# Patient Record
Sex: MALE | Race: WHITE | NOT HISPANIC OR LATINO | Employment: OTHER | URBAN - METROPOLITAN AREA
[De-identification: names, ages, dates, MRNs, and addresses within clinical notes are randomized per-mention and may not be internally consistent; named-entity substitution may affect disease eponyms.]

---

## 2017-03-08 ENCOUNTER — TELEPHONE (OUTPATIENT)
Dept: NEUROSURGERY | Facility: CLINIC | Age: 82
End: 2017-03-08

## 2017-03-08 DIAGNOSIS — D32.9 MENINGIOMA: Primary | ICD-10-CM

## 2017-03-08 NOTE — TELEPHONE ENCOUNTER
CARLOS RODARTE, DISCUSSED NEW SYMPTOMS, WILL GET MRI SOONER, PROB MID MARCH AND APPT TO FU WITH CAROLINE AFTER. APPT SLIP IN THE MAIL.

## 2017-03-08 NOTE — TELEPHONE ENCOUNTER
----- Message from Dalila Oliva sent at 3/8/2017 10:51 AM CST -----  Contact: dorian (sister) @ [phone] or [phone]  Pt sister would like to speak with with someone to see if pts mri can be scheduled earlier then dr mason requested.  pls call.

## 2017-04-10 ENCOUNTER — OFFICE VISIT (OUTPATIENT)
Dept: NEUROSURGERY | Facility: CLINIC | Age: 82
End: 2017-04-10
Payer: MEDICARE

## 2017-04-10 ENCOUNTER — HOSPITAL ENCOUNTER (OUTPATIENT)
Dept: RADIOLOGY | Facility: HOSPITAL | Age: 82
Discharge: HOME | End: 2017-04-10
Attending: NEUROLOGICAL SURGERY
Payer: MEDICARE

## 2017-04-10 VITALS
SYSTOLIC BLOOD PRESSURE: 142 MMHG | DIASTOLIC BLOOD PRESSURE: 79 MMHG | TEMPERATURE: 98 F | HEIGHT: 72 IN | WEIGHT: 173 LBS | BODY MASS INDEX: 23.43 KG/M2 | HEART RATE: 60 BPM

## 2017-04-10 DIAGNOSIS — D32.9 MENINGIOMA: ICD-10-CM

## 2017-04-10 DIAGNOSIS — D32.9 MENINGIOMA: Primary | ICD-10-CM

## 2017-04-10 PROCEDURE — 99213 OFFICE O/P EST LOW 20 MIN: CPT | Mod: PBBFAC | Performed by: NEUROLOGICAL SURGERY

## 2017-04-10 PROCEDURE — 99999 PR PBB SHADOW E&M-EST. PATIENT-LVL III: CPT | Mod: PBBFAC,,, | Performed by: NEUROLOGICAL SURGERY

## 2017-04-10 PROCEDURE — 99213 OFFICE O/P EST LOW 20 MIN: CPT | Mod: S$PBB,,, | Performed by: NEUROLOGICAL SURGERY

## 2017-04-10 PROCEDURE — 70553 MRI BRAIN STEM W/O & W/DYE: CPT | Mod: 26,GC,, | Performed by: RADIOLOGY

## 2017-04-10 RX ORDER — GADOBUTROL 604.72 MG/ML
8 INJECTION INTRAVENOUS
Status: COMPLETED | OUTPATIENT
Start: 2017-04-10 | End: 2017-04-10

## 2017-04-10 RX ORDER — RIVAROXABAN 15 MG/1
TABLET, FILM COATED ORAL
COMMUNITY
Start: 2017-01-12 | End: 2018-12-27

## 2017-04-10 RX ADMIN — GADOBUTROL 8 ML: 604.72 INJECTION INTRAVENOUS at 10:04

## 2017-04-10 NOTE — MR AVS SNAPSHOT
[facility] - Neurosurgery 7th Fl  [address]  [facility] [address]  Phone: [phone]                  Robe Saavedra   4/10/2017 11:30 AM   Office Visit    Description:  Male : 1928   Provider:  Gerald Graves MD   Department:  [facility] - Neurosurgery 7th Fl           Reason for Visit     Sinus tumor           Diagnoses this Visit        Comments    Meningioma    -  Primary            To Do List           Goals (5 Years of Data)     None      Follow-Up and Disposition     Return in about 1 year (around 4/10/2018) for reevaluation.      [facility]     [facility] On Call Nurse Care Line -  Assistance  Unless otherwise directed by your provider, please contact [facility], our nurse care line that is available for  assistance.     Registered nurses in the [facility] provide: appointment scheduling, clinical advisement, health education, and other advisory services.  Call: [phone] (toll free)               Medications           Message regarding Medications     Verify the changes and/or additions to your medication regime listed below are the same as discussed with your clinician today.  If any of these changes or additions are incorrect, please notify your healthcare provider.        STOP taking these medications     hydrochlorothiazide (HYDRODIURIL) 50 MG tablet Take 50 mg by mouth once daily.    vitamin D 185 MG Tab Take 185 mg by mouth once daily.    dronedarone (MULTAQ) 400 mg Tab Take 400 mg by mouth 2 (two) times daily with meals.           Verify that the below list of medications is an accurate representation of the medications you are currently taking.  If none reported, the list may be blank. If incorrect, please contact your healthcare provider. Carry this list with you in case of emergency.           Current Medications     amlodipine (NORVASC) 5 MG tablet Take 2.5 mg by mouth once daily.     brinzolamide (AZOPT) 1 % ophthalmic suspension 1 drop  2 (two) times daily.    lansoprazole (PREVACID) 30 MG capsule Take 30 mg by mouth once daily.    POLYETHYLENE GLYCOL 3350 (MIRALAX ORAL) Take by mouth.    testosterone (ANDROGEL) 1 %(50 mg/5 gram) GlPk Apply topically once daily.    travoprost, benzalkonium, (TRAVATAN) 0.004 % ophthalmic solution Place 1 drop into both eyes every evening.    XARELTO 15 mg Tab            Clinical Reference Information           Your Vitals Were     BP Pulse Temp Height Weight BMI    142/79 60 98.2 °F (36.8 °C) (Oral) 6' (1.829 m) 78.5 kg (173 lb) 23.46 kg/m2      Blood Pressure          Most Recent Value    BP  (!)  142/79      Allergies as of 4/10/2017     No Known Allergies      Immunizations Administered on Date of Encounter - 4/10/2017     None      [facility] Sign-Up     Activating your [facility] account is as easy as 1-2-3!     1) Visit [email or web address], select Sign Up Now, enter this activation code and your date of birth, then select Next.  [email or web address]  Expires: 5/25/2017 12:45 PM      2) Create a username and password to use when you visit [facility] in the future and select a security question in case you lose your password and select Next.    3) Enter your e-mail address and click Sign Up!    Additional Information  If you have questions, please e-mail [email or web address] or call [phone] to talk to our [facility] staff. Remember, [facility] is NOT to be used for urgent needs. For medical emergencies, dial 911.         Language Assistance Services     ATTENTION: Language assistance services are available, free of charge. Please call [phone].      ATENCIÓN: Si habla jose, tiene a charles disposición servicios gratuitos de asistencia lingüística. Llame al [phone].     CHÚ Ý: N?u b?n nói Ti?ng Vi?t, có các d?ch v? h? tr? ngôn ng? mi?n phí dành cho b?n. G?i s? [phone].         [facility] complies with applicable Federal civil rights laws and does not discriminate on the basis of  race, color, national origin, age, disability, or sex.

## 2017-04-10 NOTE — LETTER
April 10, 2017      Marycruz Smith MD  [address] [facility] [address]           [facility] - Neurosurgery 7th Fl  [address]  [place] [address]  Phone: [phone]          Patient: Robe Saavedra   MR Number: [id number]   YOB: 1928   Date of Visit: 4/10/2017       Dear Dr. Marycruz Smith:    Thank you for referring Robe Saavedra to me for evaluation. Attached you will find relevant portions of my assessment and plan of care.    If you have questions, please do not hesitate to call me. I look forward to following Robe Saavedra along with you.    Sincerely,    Gerald Graves MD    Enclosure  CC:  No Recipients    If you would like to receive this communication electronically, please contact [email or web address] or [phone] to request more information on [facility] Link access.    For providers and/or their staff who would like to refer a patient to [facility], please contact us through our one-stop-shop provider referral line, [facility], at [phone].    If you feel you have received this communication in error or would no longer like to receive these types of communications, please e-mail [email or web address]

## 2017-04-10 NOTE — PROGRESS NOTES
This office note has been dictated.    Robe Saavedra returned in neurosurgical followup to the office today.  He has   taken a couple of falls over the past three months and one of these he was on   the floor at home and unable to get up for several hours.  He was seen at [facility] in February with chest pain and cardiac arrhythmia.    This was treated.  Pacemaker was considered, but the patient did not wish to   proceed with this.  He does remain on Xarelto for stroke prevention because of   the atrial fibrillation.  He feels he has lost a little ground with vision and   was told by his ophthalmologist that he has early macular degeneration.  His   sister also notes that he has been having some increased difficulty with   short-term memory.  It was felt this might be related to his falls.    On brief examination today, he is alert and cooperative and making good sense.    He is speaking well.  I did not do a formal mental status examination, but he   seems reasonably well oriented.  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  There is a little   superior and inferior rectus. He can see out of the right eye, but things seem   distorted to him.  Extraocular movements are full on the left and vision seems   generally preserved.  There is reduced range of motion of his shoulders and he   has been getting injections for this.  He is moving his extremities well.  He is   using his prosthesis on the right lower extremity.    MRI of the brain was repeated at [facility] today and compared to his   earlier scans. 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  This is   about the same size and shape as his previous scans.  The carotid artery flow   void can be followed through the tumor and the carotid seems to be patent.    There is moderate white matter microvascular disease.    I do not believe that the cavernous sinus meningioma requires  any specific   neurosurgical intervention.  It has been stable for some time.  There is no   evidence for subdural hematoma or other factor complicating his falls.  I will   plan to see him back in about one year for reevaluation.  I do not believe we   will need to repeat MRI unless there are new symptoms.      RDS/IN  dd: 04/10/2017 12:45:00 (CDT)  td: 04/11/2017 06:49:23 (CDT)  Doc ID   #[id number]  Job ID #[id number]    CC: Robe Saavedra

## 2018-12-27 ENCOUNTER — HOSPITAL ENCOUNTER (OUTPATIENT)
Facility: HOSPITAL | Age: 83
Discharge: NURSING, REHAB OR LONG-TERM CARE | End: 2018-12-28
Attending: EMERGENCY MEDICINE | Admitting: HOSPITALIST
Payer: MEDICARE

## 2018-12-27 DIAGNOSIS — R91.1 PULMONARY NODULE: Primary | ICD-10-CM

## 2018-12-27 DIAGNOSIS — R50.9 FEVER, UNSPECIFIED FEVER CAUSE: ICD-10-CM

## 2018-12-27 DIAGNOSIS — N30.00 ACUTE CYSTITIS WITHOUT HEMATURIA: ICD-10-CM

## 2018-12-27 DIAGNOSIS — R78.81 BACTEREMIA: ICD-10-CM

## 2018-12-27 DIAGNOSIS — N30.01 ACUTE CYSTITIS WITH HEMATURIA: ICD-10-CM

## 2018-12-27 PROBLEM — I10 HTN (HYPERTENSION): Status: ACTIVE | Noted: 2018-12-27

## 2018-12-27 PROBLEM — N39.0 UTI (URINARY TRACT INFECTION): Status: ACTIVE | Noted: 2018-12-27

## 2018-12-27 PROBLEM — H40.9 GLAUCOMA OF BOTH EYES: Status: ACTIVE | Noted: 2018-12-27

## 2018-12-27 PROBLEM — I48.91 A-FIB: Status: ACTIVE | Noted: 2018-12-27

## 2018-12-27 LAB
ALBUMIN SERPL BCP-MCNC: 3.2 G/DL
ALP SERPL-CCNC: 83 U/L
ALT SERPL W/O P-5'-P-CCNC: 16 U/L
ANION GAP SERPL CALC-SCNC: 9 MMOL/L
AST SERPL-CCNC: 17 U/L
BACTERIA #/AREA URNS HPF: ABNORMAL /HPF
BASOPHILS # BLD AUTO: 0.01 K/UL
BASOPHILS NFR BLD: 0.1 %
BILIRUB SERPL-MCNC: 0.5 MG/DL
BILIRUB UR QL STRIP: NEGATIVE
BUN SERPL-MCNC: 20 MG/DL
CALCIUM SERPL-MCNC: 9.7 MG/DL
CHLORIDE SERPL-SCNC: 105 MMOL/L
CLARITY UR: ABNORMAL
CO2 SERPL-SCNC: 23 MMOL/L
COLOR UR: YELLOW
CREAT SERPL-MCNC: 0.8 MG/DL
CTP QC/QA: YES
DIFFERENTIAL METHOD: ABNORMAL
EOSINOPHIL # BLD AUTO: 0.1 K/UL
EOSINOPHIL NFR BLD: 0.6 %
ERYTHROCYTE [DISTWIDTH] IN BLOOD BY AUTOMATED COUNT: 14.8 %
EST. GFR  (AFRICAN AMERICAN): >60 ML/MIN/1.73 M^2
EST. GFR  (NON AFRICAN AMERICAN): >60 ML/MIN/1.73 M^2
FERRITIN SERPL-MCNC: 91 NG/ML
FLUAV AG NPH QL: NEGATIVE
FLUBV AG NPH QL: NEGATIVE
GLUCOSE SERPL-MCNC: 121 MG/DL
GLUCOSE UR QL STRIP: NEGATIVE
HCT VFR BLD AUTO: 39.1 %
HGB BLD-MCNC: 13.3 G/DL
HGB UR QL STRIP: ABNORMAL
HYALINE CASTS #/AREA URNS LPF: 0 /LPF
IRON SERPL-MCNC: 22 UG/DL
KETONES UR QL STRIP: NEGATIVE
LACTATE SERPL-SCNC: 1.3 MMOL/L
LEUKOCYTE ESTERASE UR QL STRIP: ABNORMAL
LYMPHOCYTES # BLD AUTO: 1.3 K/UL
LYMPHOCYTES NFR BLD: 10.9 %
MCH RBC QN AUTO: 30.6 PG
MCHC RBC AUTO-ENTMCNC: 34 G/DL
MCV RBC AUTO: 90 FL
MICROSCOPIC COMMENT: ABNORMAL
MONOCYTES # BLD AUTO: 0.8 K/UL
MONOCYTES NFR BLD: 6.8 %
NEUTROPHILS # BLD AUTO: 9.6 K/UL
NEUTROPHILS NFR BLD: 81.6 %
NITRITE UR QL STRIP: POSITIVE
PH UR STRIP: 5 [PH] (ref 5–8)
PLATELET # BLD AUTO: 209 K/UL
PMV BLD AUTO: 10.6 FL
POTASSIUM SERPL-SCNC: 3.9 MMOL/L
PROT SERPL-MCNC: 6.8 G/DL
PROT UR QL STRIP: ABNORMAL
RBC # BLD AUTO: 4.35 M/UL
RBC #/AREA URNS HPF: 10 /HPF (ref 0–4)
SATURATED IRON: 7 %
SODIUM SERPL-SCNC: 137 MMOL/L
SP GR UR STRIP: 1 (ref 1–1.03)
SQUAMOUS #/AREA URNS HPF: 5 /HPF
TOTAL IRON BINDING CAPACITY: 315 UG/DL
TRANSFERRIN SERPL-MCNC: 213 MG/DL
URN SPEC COLLECT METH UR: ABNORMAL
UROBILINOGEN UR STRIP-ACNC: NEGATIVE EU/DL
WBC # BLD AUTO: 11.7 K/UL
WBC #/AREA URNS HPF: 35 /HPF (ref 0–5)
WBC CLUMPS URNS QL MICRO: ABNORMAL

## 2018-12-27 PROCEDURE — 83540 ASSAY OF IRON: CPT

## 2018-12-27 PROCEDURE — 87088 URINE BACTERIA CULTURE: CPT

## 2018-12-27 PROCEDURE — 87077 CULTURE AEROBIC IDENTIFY: CPT

## 2018-12-27 PROCEDURE — 96365 THER/PROPH/DIAG IV INF INIT: CPT

## 2018-12-27 PROCEDURE — 99284 EMERGENCY DEPT VISIT MOD MDM: CPT | Mod: 25

## 2018-12-27 PROCEDURE — G0378 HOSPITAL OBSERVATION PER HR: HCPCS

## 2018-12-27 PROCEDURE — 63600175 PHARM REV CODE 636 W HCPCS: Performed by: EMERGENCY MEDICINE

## 2018-12-27 PROCEDURE — 82728 ASSAY OF FERRITIN: CPT

## 2018-12-27 PROCEDURE — 96366 THER/PROPH/DIAG IV INF ADDON: CPT

## 2018-12-27 PROCEDURE — 80053 COMPREHEN METABOLIC PANEL: CPT

## 2018-12-27 PROCEDURE — 85025 COMPLETE CBC W/AUTO DIFF WBC: CPT

## 2018-12-27 PROCEDURE — 87086 URINE CULTURE/COLONY COUNT: CPT

## 2018-12-27 PROCEDURE — 87040 BLOOD CULTURE FOR BACTERIA: CPT | Mod: 59

## 2018-12-27 PROCEDURE — 87186 SC STD MICRODIL/AGAR DIL: CPT

## 2018-12-27 PROCEDURE — 81000 URINALYSIS NONAUTO W/SCOPE: CPT

## 2018-12-27 PROCEDURE — 83605 ASSAY OF LACTIC ACID: CPT

## 2018-12-27 PROCEDURE — 25000003 PHARM REV CODE 250: Performed by: EMERGENCY MEDICINE

## 2018-12-27 RX ORDER — OMEPRAZOLE 20 MG/1
20 CAPSULE, DELAYED RELEASE ORAL DAILY
COMMUNITY

## 2018-12-27 RX ORDER — SODIUM CHLORIDE 9 MG/ML
INJECTION, SOLUTION INTRAVENOUS CONTINUOUS
Status: DISCONTINUED | OUTPATIENT
Start: 2018-12-27 | End: 2018-12-27

## 2018-12-27 RX ORDER — LIDOCAINE 50 MG/G
1 PATCH TOPICAL
COMMUNITY

## 2018-12-27 RX ORDER — METOPROLOL SUCCINATE 25 MG/1
25 TABLET, EXTENDED RELEASE ORAL DAILY
COMMUNITY

## 2018-12-27 RX ORDER — METOPROLOL SUCCINATE 25 MG/1
25 TABLET, EXTENDED RELEASE ORAL DAILY
Status: DISCONTINUED | OUTPATIENT
Start: 2018-12-27 | End: 2018-12-28 | Stop reason: HOSPADM

## 2018-12-27 RX ORDER — DOCUSATE SODIUM 100 MG/1
100 CAPSULE, LIQUID FILLED ORAL 2 TIMES DAILY
Status: DISCONTINUED | OUTPATIENT
Start: 2018-12-27 | End: 2018-12-28 | Stop reason: HOSPADM

## 2018-12-27 RX ORDER — ADHESIVE BANDAGE
30 BANDAGE TOPICAL DAILY PRN
COMMUNITY

## 2018-12-27 RX ORDER — POLYETHYLENE GLYCOL 3350 17 G/17G
17 POWDER, FOR SOLUTION ORAL 2 TIMES DAILY PRN
Status: DISCONTINUED | OUTPATIENT
Start: 2018-12-27 | End: 2018-12-28 | Stop reason: HOSPADM

## 2018-12-27 RX ORDER — ACETAMINOPHEN 325 MG/1
650 TABLET ORAL EVERY 6 HOURS PRN
COMMUNITY

## 2018-12-27 RX ORDER — HYDROCODONE BITARTRATE AND ACETAMINOPHEN 5; 325 MG/1; MG/1
1 TABLET ORAL EVERY 4 HOURS PRN
Status: DISCONTINUED | OUTPATIENT
Start: 2018-12-27 | End: 2018-12-27

## 2018-12-27 RX ORDER — PANTOPRAZOLE SODIUM 40 MG/1
40 TABLET, DELAYED RELEASE ORAL DAILY
Status: DISCONTINUED | OUTPATIENT
Start: 2018-12-27 | End: 2018-12-28 | Stop reason: HOSPADM

## 2018-12-27 RX ORDER — BRINZOLAMIDE 10 MG/ML
1 SUSPENSION/ DROPS OPHTHALMIC 3 TIMES DAILY
Status: DISCONTINUED | OUTPATIENT
Start: 2018-12-27 | End: 2018-12-28 | Stop reason: HOSPADM

## 2018-12-27 RX ORDER — AMLODIPINE BESYLATE 5 MG/1
10 TABLET ORAL DAILY
Status: DISCONTINUED | OUTPATIENT
Start: 2018-12-27 | End: 2018-12-28 | Stop reason: HOSPADM

## 2018-12-27 RX ORDER — ACETAMINOPHEN 325 MG/1
650 TABLET ORAL EVERY 6 HOURS PRN
Status: DISCONTINUED | OUTPATIENT
Start: 2018-12-27 | End: 2018-12-28 | Stop reason: HOSPADM

## 2018-12-27 RX ORDER — DOCUSATE SODIUM 100 MG/1
100 CAPSULE, LIQUID FILLED ORAL 2 TIMES DAILY
COMMUNITY

## 2018-12-27 RX ADMIN — AMLODIPINE BESYLATE 10 MG: 5 TABLET ORAL at 01:12

## 2018-12-27 RX ADMIN — METOPROLOL SUCCINATE 25 MG: 25 TABLET, EXTENDED RELEASE ORAL at 01:12

## 2018-12-27 RX ADMIN — DOCUSATE SODIUM 100 MG: 100 CAPSULE, LIQUID FILLED ORAL at 01:12

## 2018-12-27 RX ADMIN — BRINZOLAMIDE 1 DROP: 10 SUSPENSION/ DROPS OPHTHALMIC at 09:12

## 2018-12-27 RX ADMIN — CEFTRIAXONE 2 G: 2 INJECTION, SOLUTION INTRAVENOUS at 08:12

## 2018-12-27 RX ADMIN — PANTOPRAZOLE SODIUM 40 MG: 40 TABLET, DELAYED RELEASE ORAL at 01:12

## 2018-12-27 RX ADMIN — SODIUM CHLORIDE: 0.9 INJECTION, SOLUTION INTRAVENOUS at 01:12

## 2018-12-27 RX ADMIN — BRINZOLAMIDE 1 DROP: 10 SUSPENSION/ DROPS OPHTHALMIC at 04:12

## 2018-12-27 RX ADMIN — APIXABAN 2.5 MG: 2.5 TABLET, FILM COATED ORAL at 01:12

## 2018-12-27 RX ADMIN — ACETAMINOPHEN 650 MG: 325 TABLET ORAL at 01:12

## 2018-12-27 RX ADMIN — BIMATOPROST 1 DROP: 0.1 SOLUTION/ DROPS OPHTHALMIC at 09:12

## 2018-12-27 RX ADMIN — DOCUSATE SODIUM 100 MG: 100 CAPSULE, LIQUID FILLED ORAL at 09:12

## 2018-12-27 RX ADMIN — APIXABAN 2.5 MG: 2.5 TABLET, FILM COATED ORAL at 09:12

## 2018-12-27 NOTE — PLAN OF CARE
To patient's room to discuss patient managing his care at home.      TN Role Explained.  Patient identified by using 2 identifiers:  Name and date of birth via armband    TN name and contact info placed on the communication board       12/27/18 7163   Discharge Assessment   Assessment Type Discharge Planning Assessment   Confirmed/corrected address and phone number on facesheet? Yes   Assessment information obtained from? Medical Record   Expected Length of Stay (days) 3   Communicated expected length of stay with patient/caregiver no   Prior to hospitilization cognitive status: Unable to Assess   Current cognitive status: Unable to Assess  (asleep)   Current Functional Status: Needs Assistance   Lives With facility resident   Able to Return to Prior Arrangements yes   Is patient able to care for self after discharge? No   Who are your caregiver(s) and their phone number(s)? [facility] staff   Patient's perception of discharge disposition [facility] care facility   Readmission Within the Last 30 Days no previous admission in last 30 days   Patient currently being followed by outpatient case management? No   Patient currently receives any other outside agency services? No   Equipment Currently Used at Home (per facility)   Do you have any problems affording any of your prescribed medications? No   Is the patient taking medications as prescribed? yes   Does the patient have transportation home? Yes   Transportation Anticipated agency   Does the patient receive services at the [facility]? No   Discharge Plan A Return to nursing home   Patient/Family in Agreement with Plan yes

## 2018-12-27 NOTE — ASSESSMENT & PLAN NOTE
Will recommend outpatient follow up for further study. No complaints of respiratory problems at this time.

## 2018-12-27 NOTE — HOSPITAL COURSE
Robe Saavdera 89 y.o. male placed in observation for management of UTI. In the ED, UA with many bacteria, nitrite positive, and leukocytes, chest x-ray with pulmonary nodule, and no leukocytosis. Started on Rocephin. Mental status at baseline and remains afebrile. Switch to cipro BID. Patient stable for discharge back to [facility] on cipro 500 bid.     Addendum 1735  Urine culture grew enterobacter cloacae resistant to Cipro, sensitive to bactrim, intermediate macrobid. Spoke with House Supervisor Orion at [facility] regarding urine cutlure and he will speak to PCP at NH for new order. Will follow up.

## 2018-12-27 NOTE — ED NOTES
First contact with patient, patient was sleeping, slightly confused after being woken up, wife reports this is baseline for patient.  VSS, and patient denies pain at this time.  Wife is at bedside.

## 2018-12-27 NOTE — SUBJECTIVE & OBJECTIVE
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     Anticoagulant long-term use     stopped 2-28-13    Arthritis     Atrial fibrillation     on medication    Blood transfusion 1953    in     Cancer     skin cancer    COPD (chronic obstructive pulmonary disease)     Dysphagia     GERD (gastroesophageal reflux disease)     Glaucoma     Heart failure     Hypertension     on medicatin    Meningioma     Rt side, affecting Rt eye (Benign)    Stroke     8/1/15    Vascular dementia without behavioral disturbance        Past Surgical History:   Procedure Laterality Date    CARDIAC SURGERY      bilateral Cataracts    EXCISION, CARCINOMA, BASAL CELL Right 3/8/2013    Performed by Wil Oseguera MD at [facility] OR    PROSTATE SURGERY      Rt  AKA      wears leg prosthesis       Review of patient's allergies indicates:  No Known Allergies    No current facility-administered medications on file prior to encounter.      Current Outpatient Medications on File Prior to Encounter   Medication Sig    acetaminophen (TYLENOL) 325 MG tablet Take 650 mg by mouth every 6 (six) hours as needed for Pain.    amlodipine (NORVASC) 5 MG tablet Take 10 mg by mouth once daily.     apixaban (ELIQUIS) 2.5 mg Tab Take by mouth 2 (two) times daily.    brinzolamide (AZOPT) 1 % ophthalmic suspension 1 drop 2 (two) times daily.    docusate sodium (COLACE) 100 MG capsule Take 100 mg by mouth 2 (two) times daily.    lidocaine (LIDODERM) 5 % Place 1 patch onto the skin every 24 hours. Remove & Discard patch within 12 hours or as directed by MD    magnesium hydroxide 400 mg/5 ml (MILK OF MAGNESIA) 400 mg/5 mL Susp Take 30 mLs by mouth daily as needed.    metoprolol succinate (TOPROL-XL) 25 MG 24 hr tablet Take 25 mg by mouth once daily.    omeprazole (PRILOSEC) 20 MG capsule Take 20 mg by mouth once daily.    POLYETHYLENE GLYCOL 3350 (MIRALAX ORAL) Take by mouth.     travoprost, benzalkonium, (TRAVATAN) 0.004 % ophthalmic solution Place 1 drop into both eyes every evening.    lansoprazole (PREVACID) 30 MG capsule Take 30 mg by mouth once daily.    testosterone (ANDROGEL) 1 %(50 mg/5 gram) GlPk Apply topically once daily.    [DISCONTINUED] XARELTO 15 mg Tab      Family History     None        Tobacco Use    Smoking status: Former Smoker     Last attempt to quit: 3/4/1973     Years since quittin.8    Smokeless tobacco: Never Used   Substance and Sexual Activity    Alcohol use: No    Drug use: No    Sexual activity: No     Review of Systems   Constitutional: Negative for chills and fever.   HENT: Negative for nosebleeds and tinnitus.    Eyes: Negative for photophobia and visual disturbance.   Respiratory: Negative for shortness of breath and wheezing.    Cardiovascular: Negative for chest pain, palpitations and leg swelling.   Gastrointestinal: Negative for abdominal distention, nausea and vomiting.   Genitourinary: Positive for dysuria and frequency. Negative for flank pain and hematuria.   Musculoskeletal: Negative for gait problem and joint swelling.   Skin: Negative for rash and wound.   Neurological: Negative for seizures and syncope.     Objective:     Vital Signs (Most Recent):  Temp: 98.1 °F (36.7 °C) (18 1143)  Pulse: 60 (18 1143)  Resp: 18 (18 1143)  BP: 132/62 (18 1143)  SpO2: 95 % (18 1143) Vital Signs (24h Range):  Temp:  [98.1 °F (36.7 °C)-99.7 °F (37.6 °C)] 98.1 °F (36.7 °C)  Pulse:  [53-70] 60  Resp:  [18] 18  SpO2:  [92 %-98 %] 95 %  BP: (117-136)/(60-71) 132/62     Weight: 78.5 kg (173 lb)  Body mass index is 23.46 kg/m².    Physical Exam   Constitutional: He is oriented to person, place, and time. He appears well-developed and well-nourished. No distress.   HENT:   Head: Normocephalic and atraumatic.   Right Ear: External ear normal.   Left Ear: External ear normal.   Some difficulty hearing   Eyes: Conjunctivae and EOM  are normal. Right eye exhibits no discharge. Left eye exhibits no discharge.   Neck: Normal range of motion. No thyromegaly present.   Cardiovascular: Normal rate and regular rhythm.   No murmur heard.  Pulmonary/Chest: Effort normal and breath sounds normal. No respiratory distress.   Abdominal: Soft. Bowel sounds are normal. He exhibits no distension and no mass. There is no tenderness.   Musculoskeletal: He exhibits deformity (right below knee amputee). He exhibits no edema.   Neurological: He is alert and oriented to person, place, and time.   Some difficulty remembering year and place, but is able to recall with slight prompt. Knows birth date.    Skin: Skin is warm and dry. Capillary refill takes less than 2 seconds.   Psychiatric: He has a normal mood and affect. His behavior is normal.   Nursing note and vitals reviewed.        CRANIAL NERVES     CN III, IV, VI   Extraocular motions are normal.        Significant Labs:   CBC:   Recent Labs   Lab 12/27/18  0436   WBC 11.70   HGB 13.3*   HCT 39.1*        CMP:   Recent Labs   Lab 12/27/18  0543      K 3.9      CO2 23   *   BUN 20   CREATININE 0.8   CALCIUM 9.7   PROT 6.8   ALBUMIN 3.2*   BILITOT 0.5   ALKPHOS 83   AST 17   ALT 16   ANIONGAP 9   EGFRNONAA >60     Urine Studies:   Recent Labs   Lab 12/27/18  0638   COLORU Yellow   APPEARANCEUA Cloudy*   PHUR 5.0   SPECGRAV 1.005   PROTEINUA 1+*   GLUCUA Negative   KETONESU Negative   BILIRUBINUA Negative   OCCULTUA 2+*   NITRITE Positive*   UROBILINOGEN Negative   LEUKOCYTESUR 3+*   RBCUA 10*   WBCUA 35*   BACTERIA Many*   SQUAMEPITHEL 5   HYALINECASTS 0       Significant Imaging:   Imaging Results          X-Ray Chest AP Portable (Final result)  Result time 12/27/18 05:08:25    Final result by Wil Godfrey MD (12/27/18 05:08:25)                 Impression:      No definite evidence for consolidation to explain this patient's fever.  Nodular opacity projected over right upper lung  zone which may be related to sequela from prior trauma versus calcified nodule.  Correlation with prior studies would be helpful.  Further characterization with noncontrast chest CT should also be considered.      Electronically signed by: Wil Godfrey MD  Date:    12/27/2018  Time:    05:08             Narrative:    EXAMINATION:  XR CHEST AP PORTABLE    CLINICAL HISTORY:  fever;    TECHNIQUE:  Single frontal view of the chest was performed.    COMPARISON:  None    FINDINGS:  Trachea is patent.  Cardiac silhouette appears prominent.  There is atherosclerosis.  Lungs are well expanded.  There is opacity projected over the right upper lung zone which may be related to calcified nodule versus sequela of prior trauma.  No effusion, pneumothorax or free air below the diaphragm.  No definite evidence for consolidation.  No acute osseous abnormality.

## 2018-12-27 NOTE — ASSESSMENT & PLAN NOTE
Rate controlled currently. Will continue home eliquis and metoprolol with hold parameters.   -Telemetry

## 2018-12-27 NOTE — H&P
[facility] Medicine  History & Physical    Patient Name: Robe Saavedra  MRN: [id number]  Admission Date: 12/27/2018  Attending Physician: Caitlyn Diaz MD   Primary Care Provider: Marycruz Smith MD         Patient information was obtained from patient and ER records.     Subjective:     Principal Problem:UTI (urinary tract infection)    Chief Complaint:   Chief Complaint   Patient presents with    Fever     Patient is from [facility]. According to [facility] patient initially had a fever of 103.5 - given 650mg of tylenol - retook and was 101.7 --> now 99.7 oral         HPI: Robe Saavedra 89 y.o. male with HTN, afib, and glaucoma presents to the hospital from a NH with a chief complaint of fever. He reports at this nursing home he was found to have a fever of 103F. He reports he has also been experiencing increased urinary frequency and dysuria at home which has been on going for the last week. He reports currently he is tired as he spent a long period of time in the ED last night. The sister at bedside is his POA and reports for the last week he has seemed more tired than normal. She reports while at [facility] he was found to have a fever and sent to the ED. He is wheelchair bound at baseline, and requires max assist for most activities. He endorses fever, dysuria, and urinary frequency. He denies chest pain, SOB, N/V/D, abdominal pain, dizziness, and syncope.    In the ED, UA with many bacteria, nitrite positive, and leukocytes, chest x-ray with pulmonary nodule, and no leukocytosis.     Sister who is POA stated the patient wished to be a DNR. [facility] well as at [facility]. [employer] form signed and sister's POA copied into patient's chart.     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     Anticoagulant long-term use     stopped 2-28-13     Arthritis     Atrial fibrillation     on medication    Blood transfusion 1953    in     Cancer     skin cancer    COPD (chronic obstructive pulmonary disease)     Dysphagia     GERD (gastroesophageal reflux disease)     Glaucoma     Heart failure     Hypertension     on medicatin    Meningioma     Rt side, affecting Rt eye (Benign)    Stroke     8/1/15    Vascular dementia without behavioral disturbance        Past Surgical History:   Procedure Laterality Date    CARDIAC SURGERY      bilateral Cataracts    EXCISION, CARCINOMA, BASAL CELL Right 3/8/2013    [facility]    PROSTATE SURGERY      Rt  AKA      wears leg prosthesis       Review of patient's allergies indicates:  No Known Allergies    No current facility-administered medications on file prior to encounter.      Current Outpatient Medications on File Prior to Encounter   Medication Sig    acetaminophen (TYLENOL) 325 MG tablet Take 650 mg by mouth every 6 (six) hours as needed for Pain.    amlodipine (NORVASC) 5 MG tablet Take 10 mg by mouth once daily.     apixaban (ELIQUIS) 2.5 mg Tab Take by mouth 2 (two) times daily.    brinzolamide (AZOPT) 1 % ophthalmic suspension 1 drop 2 (two) times daily.    docusate sodium (COLACE) 100 MG capsule Take 100 mg by mouth 2 (two) times daily.    lidocaine (LIDODERM) 5 % Place 1 patch onto the skin every 24 hours. Remove & Discard patch within 12 hours or as directed by MD    magnesium hydroxide 400 mg/5 ml (MILK OF MAGNESIA) 400 mg/5 mL Susp Take 30 mLs by mouth daily as needed.    metoprolol succinate (TOPROL-XL) 25 MG 24 hr tablet Take 25 mg by mouth once daily.    omeprazole (PRILOSEC) 20 MG capsule Take 20 mg by mouth once daily.    POLYETHYLENE GLYCOL 3350 (MIRALAX ORAL) Take by mouth.    travoprost, benzalkonium, (TRAVATAN) 0.004 % ophthalmic solution Place 1 drop into both eyes every evening.    lansoprazole (PREVACID) 30 MG capsule Take 30 mg by  mouth once daily.    testosterone (ANDROGEL) 1 %(50 mg/5 gram) GlPk Apply topically once daily.    [DISCONTINUED] XARELTO 15 mg Tab      Family History     None        Tobacco Use    Smoking status: Former Smoker     Last attempt to quit: 3/4/1973     Years since quittin.8    Smokeless tobacco: Never Used   Substance and Sexual Activity    Alcohol use: No    Drug use: No    Sexual activity: No     Review of Systems   Constitutional: Negative for chills and fever.   HENT: Negative for nosebleeds and tinnitus.    Eyes: Negative for photophobia and visual disturbance.   Respiratory: Negative for shortness of breath and wheezing.    Cardiovascular: Negative for chest pain, palpitations and leg swelling.   Gastrointestinal: Negative for abdominal distention, nausea and vomiting.   Genitourinary: Positive for dysuria and frequency. Negative for flank pain and hematuria.   Musculoskeletal: Negative for gait problem and joint swelling.   Skin: Negative for rash and wound.   Neurological: Negative for seizures and syncope.     Objective:     Vital Signs (Most Recent):  Temp: 98.1 °F (36.7 °C) (18 1143)  Pulse: 60 (18 1143)  Resp: 18 (18 1143)  BP: 132/62 (18 1143)  SpO2: 95 % (18 1143) Vital Signs (24h Range):  Temp:  [98.1 °F (36.7 °C)-99.7 °F (37.6 °C)] 98.1 °F (36.7 °C)  Pulse:  [53-70] 60  Resp:  [18] 18  SpO2:  [92 %-98 %] 95 %  BP: (117-136)/(60-71) 132/62     Weight: 78.5 kg (173 lb)  Body mass index is 23.46 kg/m².    Physical Exam   Constitutional: He is oriented to person, place, and time. He appears well-developed and well-nourished. No distress.   HENT:   Head: Normocephalic and atraumatic.   Right Ear: External ear normal.   Left Ear: External ear normal.   Some difficulty hearing   Eyes: Conjunctivae and EOM are normal. Right eye exhibits no discharge. Left eye exhibits no discharge.   Neck: Normal range of motion. No thyromegaly present.   Cardiovascular: Normal rate  and regular rhythm.   No murmur heard.  Pulmonary/Chest: Effort normal and breath sounds normal. No respiratory distress.   Abdominal: Soft. Bowel sounds are normal. He exhibits no distension and no mass. There is no tenderness.   Musculoskeletal: He exhibits deformity (right below knee amputee). He exhibits no edema.   Neurological: He is alert and oriented to person, place, and time.   Some difficulty remembering year and place, but is able to recall with slight prompt. Knows birth date.    Skin: Skin is warm and dry. Capillary refill takes less than 2 seconds.   Psychiatric: He has a normal mood and affect. His behavior is normal.   Nursing note and vitals reviewed.        CRANIAL NERVES     CN III, IV, VI   Extraocular motions are normal.        Significant Labs:   CBC:   Recent Labs   Lab 12/27/18  0436   WBC 11.70   HGB 13.3*   HCT 39.1*        CMP:   Recent Labs   Lab 12/27/18  0543      K 3.9      CO2 23   *   BUN 20   CREATININE 0.8   CALCIUM 9.7   PROT 6.8   ALBUMIN 3.2*   BILITOT 0.5   ALKPHOS 83   AST 17   ALT 16   ANIONGAP 9   EGFRNONAA >60     Urine Studies:   Recent Labs   Lab 12/27/18  0638   COLORU Yellow   APPEARANCEUA Cloudy*   PHUR 5.0   SPECGRAV 1.005   PROTEINUA 1+*   GLUCUA Negative   KETONESU Negative   BILIRUBINUA Negative   OCCULTUA 2+*   NITRITE Positive*   UROBILINOGEN Negative   LEUKOCYTESUR 3+*   RBCUA 10*   WBCUA 35*   BACTERIA Many*   SQUAMEPITHEL 5   HYALINECASTS 0       Significant Imaging:   Imaging Results          X-Ray Chest AP Portable (Final result)  Result time 12/27/18 05:08:25    Final result by Wil Godfrey MD (12/27/18 05:08:25)                 Impression:      No definite evidence for consolidation to explain this patient's fever.  Nodular opacity projected over right upper lung zone which may be related to sequela from prior trauma versus calcified nodule.  Correlation with prior studies would be helpful.  Further characterization with  noncontrast chest CT should also be considered.      Electronically signed by: Wil Godfrey MD  Date:    12/27/2018  Time:    05:08             Narrative:    EXAMINATION:  XR CHEST AP PORTABLE    CLINICAL HISTORY:  fever;    TECHNIQUE:  Single frontal view of the chest was performed.    COMPARISON:  None    FINDINGS:  Trachea is patent.  Cardiac silhouette appears prominent.  There is atherosclerosis.  Lungs are well expanded.  There is opacity projected over the right upper lung zone which may be related to calcified nodule versus sequela of prior trauma.  No effusion, pneumothorax or free air below the diaphragm.  No definite evidence for consolidation.  No acute osseous abnormality.                                  Assessment/Plan:     * UTI (urinary tract infection)    UA in the ED with nitrites, bacteria, and WBC. Patient with urinary symptoms. Some episodes of confusion, however, sister reports this is baseline for patient.   -Blood/urine cultures pending  -Anti-pyretics  -Rocephin  -Neuro checks/fall precautions     Glaucoma of both eyes    Stable. Will continue home Azopt and Travatan     HTN (hypertension)    Well controlled will continue home amlodipine and metoprolol with hold parameters     A-fib    Rate controlled currently. Will continue home eliquis and metoprolol with hold parameters.   -Telemetry     Pulmonary nodule    Will recommend outpatient follow up for further study. No complaints of respiratory problems at this time.        VTE Risk Mitigation (From admission, onward)        Ordered     apixaban tablet 2.5 mg  2 times daily      12/27/18 1104     IP VTE HIGH RISK PATIENT  Once      12/27/18 1104     Reason for No Pharmacological VTE Prophylaxis  Once      12/27/18 1104        VTE: apixaban  Code: DNR; POA [facility]. Scanned POA into chart. [employer] form signed  Diet: regular  Dispo: pending further IV abx     Kashmir Amos PA-C  Department of Hospital Medicine    [facility]

## 2018-12-27 NOTE — HPI
Robe Saavedra 89 y.o. male with HTN, afib, and glaucoma presents to the hospital from a NH with a chief complaint of fever. He reports at this nursing home he was found to have a fever of 103F. He reports he has also been experiencing increased urinary frequency and dysuria at home which has been on going for the last week. He reports currently he is tired as he spent a long period of time in the ED last night. The sister at bedside is his POA and reports for the last week he has seemed more tired than normal. She reports while at [facility] he was found to have a fever and sent to the ED. He is wheelchair bound at baseline, and requires max assist for most activities. He endorses fever, dysuria, and urinary frequency. He denies chest pain, SOB, N/V/D, abdominal pain, dizziness, and syncope.    In the ED, UA with many bacteria, nitrite positive, and leukocytes, chest x-ray with pulmonary nodule, and no leukocytosis.     Sister who is POA stated the patient wished to be a DNR. [facility] and [facility] there as well as at [facility]. [employer] form signed and sister's POA copied into patient's chart.

## 2018-12-27 NOTE — ED TRIAGE NOTES
Patient presents to the ED from [facility] with the initial complaint of fever. Patient had a reported fever of 103.5 per the nurse; the patient was given 650 mg of tylenol. Temperature retaken and was 101.7 orally per the nurse. At [facility] it was 99.7 orally. Patient is a&ox4, NAD, VSS.

## 2018-12-27 NOTE — ED PROVIDER NOTES
Encounter Date: 12/27/2018    SCRIBE #1 NOTE: I, Cj Waldrop II, am scribing for, and in the presence of,  Tanesha Giron MD. I have scribed the following portions of the note - Other sections scribed: HPI, ROS.       History     Chief Complaint   Patient presents with    Fever     Patient is from [facility]. According to [facility] patient initially had a fever of 103.5 - given 650mg of tylenol - retook and was 101.7 --> now 99.7 oral      CC: Fever     HPI: This 89 y.o. male presents to the ED c/o acute onset, fever x1 day. Sister reports the pt was sent from [facility] c/o fever but she states the pt was fine yesterday. She reports the may have an UTI because he has been urinating more frequently. She reports the pt is compliant with his Eloquis. Pt hx is limited due the pt being a poor historian.        PMHx: meningioma, HTN, cancer, atrial fibrillation, blood transfusion, stroke, GERD, arthritis, vascular dementia, heart failure, dysphagia, acquired absence of right leg below knee, COPD, glaucoma, and       The history is provided by the patient. No  was used.     This is doctor Pichardo dictating.  I assumed care of this patient at 6:00 a.m..  This patient presents with a history of a fever at the nursing home.  He has some mild dysuria and urinary frequency. He has some mild facial congestion but no cough.  He has a generalized headache but no neck pain.  His family relates that he is acting his normal self.  He has been a little tired and sleepy over the course of the last 24 hr.  They are concerned about him having a urinary tract infection.  There is no coughing.  The patient is otherwise well.      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     Anticoagulant long-term use     stopped 2-28-13    Arthritis     Atrial fibrillation      on medication    Blood transfusion     in     Cancer     skin cancer    COPD (chronic obstructive pulmonary disease)     Dysphagia     GERD (gastroesophageal reflux disease)     Glaucoma     Heart failure     Hypertension     on medicatin    Meningioma     Rt side, affecting Rt eye (Benign)    Stroke     8/1/15    Vascular dementia without behavioral disturbance      Past Surgical History:   Procedure Laterality Date    CARDIAC SURGERY      bilateral Cataracts    EXCISION, CARCINOMA, BASAL CELL Right 3/8/2013    Performed by [facility] at [facility] OR    PROSTATE SURGERY      Rt  AKA      wears leg prosthesis     History reviewed. No pertinent family history.  Social History     Tobacco Use    Smoking status: Former Smoker     Last attempt to quit: 3/4/1973     Years since quittin.8    Smokeless tobacco: Never Used   Substance Use Topics    Alcohol use: No    Drug use: No     Review of Systems   Unable to perform ROS: Other (Poor historian)   Constitutional: Positive for fever.      The patient was questioned specifically with regard to the following.  General: Fever, chills, sweats. Neuro: Headache. Eyes: eye problems. ENT: Ear pain, sore throat. Cardiovascular: Chest pain. Respiratory: Cough, shortness of breath. Gastrointestinal: Abdominal pain, vomiting, diarrhea. Genitourinary: Painful urination.  Musculoskeletal: Arm and leg problems. Skin: Rash.  The review of systems was negative except for the following:  Dysuria facial congestion generalized headache fever, urinary frequency, sleepy and tired yesterday    Physical Exam     Initial Vitals [18 0420]   BP Pulse Resp Temp SpO2   120/61 70 18 99.7 °F (37.6 °C) 98 %      MAP       --         Physical Exam  The patient was examined specifically for the following:   General:No significant distress, Good color, Warm and dry. Head and neck:Scalp atraumatic, Neck supple. Neurological:Appropriate conversation, Gross  motor deficits. Eyes:Conjugate gaze, Clear corneas. ENT: No epistaxis. Cardiac: Regular rate and rhythm, Grossly normal heart tones. Pulmonary: Wheezing, Rales. Gastrointestinal: Abdominal tenderness, Abdominal distention. Musculoskeletal: Extremity deformity, Apparent pain with range of motion of the joints. Skin: Rash.   The findings on examination were normal except for the following:  The patient is hard of hearing.  He is awake and alert. He is pain warm and dry. Vital signs are stable.  The neck is supple. The lungs are clear.  The heart tones are normal.  The abdomen is nontender.  The patient has a right above-the-knee amputation.   ED Course   Procedures  Labs Reviewed   CBC W/ AUTO DIFFERENTIAL - Abnormal; Notable for the following components:       Result Value    RBC 4.35 (*)     Hemoglobin 13.3 (*)     Hematocrit 39.1 (*)     RDW 14.8 (*)     Gran # (ANC) 9.6 (*)     Gran% 81.6 (*)     Lymph% 10.9 (*)     All other components within normal limits   COMPREHENSIVE METABOLIC PANEL - Abnormal; Notable for the following components:    Glucose 121 (*)     Albumin 3.2 (*)     All other components within normal limits    Narrative:     Recoll. [id number] by [facility] at 12/27/2018 05:25, reason: Specimen   hemolyzed   URINALYSIS, REFLEX TO URINE CULTURE - Abnormal; Notable for the following components:    Appearance, UA Cloudy (*)     Protein, UA 1+ (*)     Occult Blood UA 2+ (*)     Nitrite, UA Positive (*)     Leukocytes, UA 3+ (*)     All other components within normal limits    Narrative:     Preferred Collection Type->Urine, Clean Catch   URINALYSIS MICROSCOPIC - Abnormal; Notable for the following components:    RBC, UA 10 (*)     WBC, UA 35 (*)     WBC Clumps, UA Few (*)     Bacteria, UA Many (*)     All other components within normal limits    Narrative:     Preferred Collection Type->Urine, Clean Catch   CULTURE, BLOOD   CULTURE, BLOOD   CULTURE, URINE   LACTIC ACID, PLASMA   POCT INFLUENZA A/B     EKG  Readings: (Independently Interpreted)   .       Imaging Results          X-Ray Chest AP Portable (Final result)  Result time 12/27/18 05:08:25    Final result by Wil Godfrey MD (12/27/18 05:08:25)                 Impression:      No definite evidence for consolidation to explain this patient's fever.  Nodular opacity projected over right upper lung zone which may be related to sequela from prior trauma versus calcified nodule.  Correlation with prior studies would be helpful.  Further characterization with noncontrast chest CT should also be considered.      Electronically signed by: Wil Godfrey MD  Date:    12/27/2018  Time:    05:08             Narrative:    EXAMINATION:  XR CHEST AP PORTABLE    CLINICAL HISTORY:  fever;    TECHNIQUE:  Single frontal view of the chest was performed.    COMPARISON:  None    FINDINGS:  Trachea is patent.  Cardiac silhouette appears prominent.  There is atherosclerosis.  Lungs are well expanded.  There is opacity projected over the right upper lung zone which may be related to calcified nodule versus sequela of prior trauma.  No effusion, pneumothorax or free air below the diaphragm.  No definite evidence for consolidation.  No acute osseous abnormality.                              Medical decision making:  Given the above, this patient presents to the emergency room with a history of a fever of 103.5 in the field.  The patient was treated with Tylenol and has been afebrile in the emergency room he was sleepy and tired yesterday.  He has been periodically confused according to family.  None of this behavior is extremely unusual for him.  He does have acute cystitis.  I am concerned about possible bacteremia.  I will admit him and treat him with Rocephin.                Scribe Attestation:   Scribe #1: I performed the above scribed service and the documentation accurately describes the services I performed. I attest to the accuracy of the note.    Attending Attestation:            Physician Attestation for Scribe:  Physician Attestation Statement for Scribe #1: I, Tanesha Giron MD, reviewed documentation, as scribed by Cj Waldrop II in my presence, and it is both accurate and complete.                    Clinical Impression:   The primary encounter diagnosis was Pulmonary nodule. Diagnoses of Fever, unspecified fever cause, Acute cystitis without hematuria, and Bacteremia were also pertinent to this visit.                             Andre Pichardo MD  12/27/18 0750       Andre Pichardo MD  12/27/18 0755

## 2018-12-27 NOTE — ASSESSMENT & PLAN NOTE
UA in the ED with nitrites, bacteria, and WBC. Patient with urinary symptoms. Some episodes of confusion, however, sister reports this is baseline for patient.   -Blood/urine cultures pending  -Anti-pyretics  -Rocephin  -Neuro checks/fall precautions

## 2018-12-28 VITALS
WEIGHT: 174.69 LBS | HEART RATE: 63 BPM | DIASTOLIC BLOOD PRESSURE: 66 MMHG | RESPIRATION RATE: 18 BRPM | OXYGEN SATURATION: 96 % | HEIGHT: 72 IN | BODY MASS INDEX: 23.66 KG/M2 | SYSTOLIC BLOOD PRESSURE: 121 MMHG | TEMPERATURE: 99 F

## 2018-12-28 LAB
ANION GAP SERPL CALC-SCNC: 8 MMOL/L
BASOPHILS # BLD AUTO: 0.03 K/UL
BASOPHILS NFR BLD: 0.4 %
BUN SERPL-MCNC: 16 MG/DL
CALCIUM SERPL-MCNC: 9.9 MG/DL
CHLORIDE SERPL-SCNC: 109 MMOL/L
CO2 SERPL-SCNC: 22 MMOL/L
CREAT SERPL-MCNC: 0.7 MG/DL
DIFFERENTIAL METHOD: ABNORMAL
EOSINOPHIL # BLD AUTO: 0.5 K/UL
EOSINOPHIL NFR BLD: 7.2 %
ERYTHROCYTE [DISTWIDTH] IN BLOOD BY AUTOMATED COUNT: 15 %
EST. GFR  (AFRICAN AMERICAN): >60 ML/MIN/1.73 M^2
EST. GFR  (NON AFRICAN AMERICAN): >60 ML/MIN/1.73 M^2
GLUCOSE SERPL-MCNC: 98 MG/DL
HCT VFR BLD AUTO: 39.6 %
HGB BLD-MCNC: 13.4 G/DL
LYMPHOCYTES # BLD AUTO: 1.5 K/UL
LYMPHOCYTES NFR BLD: 22.3 %
MCH RBC QN AUTO: 30.7 PG
MCHC RBC AUTO-ENTMCNC: 33.8 G/DL
MCV RBC AUTO: 91 FL
MONOCYTES # BLD AUTO: 0.8 K/UL
MONOCYTES NFR BLD: 11.1 %
NEUTROPHILS # BLD AUTO: 4 K/UL
NEUTROPHILS NFR BLD: 59 %
PLATELET # BLD AUTO: 156 K/UL
PMV BLD AUTO: 11 FL
POTASSIUM SERPL-SCNC: 4.1 MMOL/L
RBC # BLD AUTO: 4.37 M/UL
SODIUM SERPL-SCNC: 139 MMOL/L
WBC # BLD AUTO: 6.82 K/UL

## 2018-12-28 PROCEDURE — 80048 BASIC METABOLIC PNL TOTAL CA: CPT

## 2018-12-28 PROCEDURE — 25000003 PHARM REV CODE 250: Performed by: PHYSICIAN ASSISTANT

## 2018-12-28 PROCEDURE — G0378 HOSPITAL OBSERVATION PER HR: HCPCS

## 2018-12-28 PROCEDURE — 85025 COMPLETE CBC W/AUTO DIFF WBC: CPT

## 2018-12-28 PROCEDURE — 63600175 PHARM REV CODE 636 W HCPCS: Performed by: EMERGENCY MEDICINE

## 2018-12-28 PROCEDURE — 25000003 PHARM REV CODE 250: Performed by: EMERGENCY MEDICINE

## 2018-12-28 RX ORDER — CIPROFLOXACIN 500 MG/1
500 TABLET ORAL EVERY 12 HOURS
Status: DISCONTINUED | OUTPATIENT
Start: 2018-12-29 | End: 2018-12-28 | Stop reason: HOSPADM

## 2018-12-28 RX ORDER — CIPROFLOXACIN 500 MG/1
500 TABLET ORAL EVERY 12 HOURS
Qty: 14 TABLET | Refills: 0 | Status: SHIPPED | OUTPATIENT
Start: 2018-12-29 | End: 2019-01-05

## 2018-12-28 RX ADMIN — AMLODIPINE BESYLATE 10 MG: 5 TABLET ORAL at 08:12

## 2018-12-28 RX ADMIN — METOPROLOL SUCCINATE 25 MG: 25 TABLET, EXTENDED RELEASE ORAL at 08:12

## 2018-12-28 RX ADMIN — PANTOPRAZOLE SODIUM 40 MG: 40 TABLET, DELAYED RELEASE ORAL at 08:12

## 2018-12-28 RX ADMIN — DOCUSATE SODIUM 100 MG: 100 CAPSULE, LIQUID FILLED ORAL at 08:12

## 2018-12-28 RX ADMIN — APIXABAN 2.5 MG: 2.5 TABLET, FILM COATED ORAL at 08:12

## 2018-12-28 RX ADMIN — CEFTRIAXONE 1 G: 1 INJECTION, SOLUTION INTRAVENOUS at 08:12

## 2018-12-28 RX ADMIN — BRINZOLAMIDE 1 DROP: 10 SUSPENSION/ DROPS OPHTHALMIC at 08:12

## 2018-12-28 NOTE — PLAN OF CARE
[facility]     Department of Hospital Medicine     [address] [place] [address]     [phone] after hours  [phone] fax       NURSING HOME ORDERS    12/28/2018    Admit to Nursing Home:  Regular Bed      Diagnoses:  Active Hospital Problems    Diagnosis  POA    *UTI (urinary tract infection) [N39.0]  Unknown    Pulmonary nodule [R91.1]  Yes    A-fib [I48.91]  Unknown    HTN (hypertension) [I10]  Unknown    Glaucoma of both eyes [H40.9]  Unknown      Resolved Hospital Problems   No resolved problems to display.       Patient is homebound due to:  UTI (urinary tract infection)    Allergies:Review of patient's allergies indicates:  No Known Allergies    Vitals:     Every shift (Skilled Nursing patients)    Diet:  Cardiac Diet      Acitivities:     As tolerated    LABS:  Per facility protocol    Nursing Precautions:   - Aspiration precautions:             - Total assistance with meals            -  Upright 90 degrees befor during and after meals             -  Suction at bedside          - Fall precautions per nursing home protocol   - Seizure precaution per [facility] protocol   - Decubitus precautions:        -  for positioning   - Pressure reducing foam mattress   - Turn patient every two hours. Use wedge pillows to anchor patient    CONSULTS:   As prior to admit   Physical Therapy to evaluate and treat     Occupational Therapy to evaluate and treat     Speech Therapy  to evaluate and treat     Nutrition to evaluate and recommend diet     Psychiatry to evaluate and follow patients for delirium    MISCELLANEOUS CARE:                   Routine Skin for Bedridden Patients:  Apply moisture barrier cream to all    skin folds and wet areas in perineal area daily and after baths and                           all bowel movements.             Medications: Discontinue all previous medication orders, if any. See new list below.   Robe Saavedra    Home Medication Instructions JUAN DAVID:[id number]    Printed on:12/28/18 4488   Medication Information                      acetaminophen (TYLENOL) 325 MG tablet  Take 650 mg by mouth every 6 (six) hours as needed for Pain.             amlodipine (NORVASC) 5 MG tablet  Take 10 mg by mouth once daily.              apixaban (ELIQUIS) 2.5 mg Tab  Take by mouth 2 (two) times daily.             brinzolamide (AZOPT) 1 % ophthalmic suspension  1 drop 2 (two) times daily.             ciprofloxacin HCl (CIPRO) 500 MG tablet  Take 1 tablet (500 mg total) by mouth every 12 (twelve) hours. for 7 days             docusate sodium (COLACE) 100 MG capsule  Take 100 mg by mouth 2 (two) times daily.             lansoprazole (PREVACID) 30 MG capsule  Take 30 mg by mouth once daily.             lidocaine (LIDODERM) 5 %  Place 1 patch onto the skin every 24 hours. Remove & Discard patch within 12 hours or as directed by MD             magnesium hydroxide 400 mg/5 ml (MILK OF MAGNESIA) 400 mg/5 mL Susp  Take 30 mLs by mouth daily as needed.             metoprolol succinate (TOPROL-XL) 25 MG 24 hr tablet  Take 25 mg by mouth once daily.             omeprazole (PRILOSEC) 20 MG capsule  Take 20 mg by mouth once daily.             POLYETHYLENE GLYCOL 3350 (MIRALAX ORAL)  Take by mouth.             testosterone (ANDROGEL) 1 %(50 mg/5 gram) GlPk  Apply topically once daily.             travoprost, benzalkonium, (TRAVATAN) 0.004 % ophthalmic solution  Place 1 drop into both eyes every evening.                   _________________________________  Sveta Montero NP  12/28/2018

## 2018-12-28 NOTE — PLAN OF CARE
12/28/18 1610   Final Note   Assessment Type Final Discharge Note   Anticipated Discharge Disposition Int Care Fac   Hospital Follow Up  Appt(s) scheduled? Yes   Discharge plans and expectations educations in teach back method with documentation complete? Yes   Right Care Referral Info   Post Acute Recommendation Other   Referral Type NH return   Facility Name [facility]

## 2018-12-28 NOTE — PROGRESS NOTES
Orders received for patient to return to [facility].  [facility]  TN called [facility] and notified Milena in admissions that the patient is ready to return.    1559:  Call report information received from Milena at [facility].  Nurse to call report to 2nd floor nurse at [phone] room [phone].  Transfer envelope given to Elham gomez.  TN called [facility] for transport @ [phone]. Van will arrive wiin 1-2 hours.  Elham Gomez notified

## 2018-12-28 NOTE — PLAN OF CARE
Problem: Adult Inpatient Plan of Care  Goal: Plan of Care Review  Outcome: Ongoing (interventions implemented as appropriate)  Pt did very well overnight. VSSAF. Frequent urination; incontinence care completed per writer and CNA Adilia. Blanchable redness to buttocks noted; turn q2h maintained with wedge and pillows in use. No new skin breakdown noted. Bed alarm active, SR x 3. Neuro checks q4h; varying degrees of level of consciousness between spontaneous eye opening and opening to voice, disoriented to time. Pt [place], but very easily reoriented and very cooperative. No falls or injuries noted this shift.

## 2018-12-29 LAB — BACTERIA UR CULT: NORMAL

## 2018-12-29 NOTE — DISCHARGE SUMMARY
[facility] Hospital Medicine  Discharge Summary      Patient Name: Robe Saavedra  MRN: [id number]  Admission Date: 12/27/2018  Hospital Length of Stay: 0 days  Discharge Date and Time: 12/28/18  Attending Physician: No att. providers found   Discharging Provider: Sveta Montero NP  Primary Care Provider: Marycruz Smith MD      HPI:   Robe Saavedra 89 y.o. male with HTN, afib, and glaucoma presents to the hospital from a NH with a chief complaint of fever. He reports at this nursing home he was found to have a fever of 103F. He reports he has also been experiencing increased urinary frequency and dysuria at home which has been on going for the last week. He reports currently he is tired as he spent a long period of time in the ED last night. The sister at bedside is his POA and reports for the last week he has seemed more tired than normal. She reports while at [facility] he was found to have a fever and sent to the ED. He is wheelchair bound at baseline, and requires max assist for most activities. He endorses fever, dysuria, and urinary frequency. He denies chest pain, SOB, N/V/D, abdominal pain, dizziness, and syncope.    In the ED, UA with many bacteria, nitrite positive, and leukocytes, chest x-ray with pulmonary nodule, and no leukocytosis.     Sister who is POA stated the patient wished to be a DNR. [facility] there as well as at [facility]. [place] form signed and sister's POA copied into patient's chart.     * No surgery found *      Hospital Course:   Robe Saavedra 89 y.o. male placed in observation for management of UTI. In the ED, UA with many bacteria, nitrite positive, and leukocytes, chest x-ray with pulmonary nodule, and no leukocytosis. Started on Rocephin. Mental status at baseline and remains afebrile. Switch to cipro BID. Patient stable for discharge back to [facility] on cipro 500 bid.     Addendum  1735  Urine culture grew enterobacter cloacae resistant to Cipro, sensitive to bactrim, intermediate macrobid. Spoke with House Supervisor Orion at [facility] regarding urine cutlure and he will speak to PCP at NH for new order. Will follow up.      Consults:     No new Assessment & Plan notes have been filed under this hospital service since the last note was generated.  Service: Hospital Medicine    Final Active Diagnoses:    Diagnosis Date Noted POA    PRINCIPAL PROBLEM:  UTI (urinary tract infection) [N39.0] 12/27/2018 Unknown    Pulmonary nodule [R91.1] 12/27/2018 Yes    A-fib [I48.91] 12/27/2018 Unknown    HTN (hypertension) [I10] 12/27/2018 Unknown    Glaucoma of both eyes [H40.9] 12/27/2018 Unknown      Problems Resolved During this Admission:       Discharged Condition: good    Disposition: Skilled Nursing Facility    Follow Up:    Patient Instructions:      Diet Cardiac     Activity as tolerated       Pending Diagnostic Studies:     None         Medications:  Reconciled Home Medications:      Medication List      START taking these medications    ciprofloxacin HCl 500 MG tablet  Commonly known as:  CIPRO  Take 1 tablet (500 mg total) by mouth every 12 (twelve) hours. for 7 days        CONTINUE taking these medications    acetaminophen 325 MG tablet  Commonly known as:  TYLENOL  Take 650 mg by mouth every 6 (six) hours as needed for Pain.     amLODIPine 5 MG tablet  Commonly known as:  NORVASC  Take 10 mg by mouth once daily.     brinzolamide 1 % ophthalmic suspension  Commonly known as:  AZOPT  1 drop 2 (two) times daily.     docusate sodium 100 MG capsule  Commonly known as:  COLACE  Take 100 mg by mouth 2 (two) times daily.     ELIQUIS 2.5 mg Tab  Generic drug:  apixaban  Take by mouth 2 (two) times daily.     lansoprazole 30 MG capsule  Commonly known as:  PREVACID  Take 30 mg by mouth once daily.     lidocaine 5 %  Commonly known as:  LIDODERM  Place 1 patch onto the skin every 24 hours.  Remove & Discard patch within 12 hours or as directed by MD     metoprolol succinate 25 MG 24 hr tablet  Commonly known as:  TOPROL-XL  Take 25 mg by mouth once daily.     MILK OF MAGNESIA 400 mg/5 mL Susp  Generic drug:  magnesium hydroxide 400 mg/5 ml  Take 30 mLs by mouth daily as needed.     MIRALAX ORAL  Take by mouth.     omeprazole 20 MG capsule  Commonly known as:  PRILOSEC  Take 20 mg by mouth once daily.     testosterone 1 % (50 mg/5 gram) Glpk  Commonly known as:  ANDROGEL  Apply topically once daily.     travoprost (benzalkonium) 0.004 % ophthalmic solution  Commonly known as:  TRAVATAN  Place 1 drop into both eyes every evening.        STOP taking these medications    XARELTO 15 mg Tab  Generic drug:  rivaroxaban            Indwelling Lines/Drains at time of discharge:   Lines/Drains/Airways          None          Time spent on the discharge of patient: 30 minutes  Patient was seen and examined on the date of discharge and determined to be suitable for discharge.         Sveta Montero NP  Department of Hospital Medicine  [facility]

## 2018-12-29 NOTE — PROGRESS NOTES
Pt discharged to [facility]. [facility] with transporter x 1 to bring pt back to facility. Belongings with pt's sister. Transfer paperwork given to [facility] transporter. IV removed per day shift RN. Discharge paperwork provided to family. Incontinence care performed per writer and CNA. Transferred pt to wheelchair with assist x 3. Pt secured per chest strap. VSSAF. Questions answered for family; family verbalizes understanding. Pt left floor via wheelchair with family and [facility].

## 2018-12-29 NOTE — NURSING
"Call placed to [facility] regarding delayed transportation. [facility] rep stated they are "backed up," and that the patient in question was "next on the list."  "

## 2019-01-01 LAB
BACTERIA BLD CULT: NORMAL
BACTERIA BLD CULT: NORMAL

## 2019-02-11 ENCOUNTER — HOSPITAL ENCOUNTER (EMERGENCY)
Facility: HOSPITAL | Age: 84
Discharge: HOME | End: 2019-02-11
Attending: EMERGENCY MEDICINE
Payer: MEDICARE

## 2019-02-11 VITALS
OXYGEN SATURATION: 98 % | RESPIRATION RATE: 16 BRPM | DIASTOLIC BLOOD PRESSURE: 72 MMHG | WEIGHT: 156 LBS | SYSTOLIC BLOOD PRESSURE: 150 MMHG | BODY MASS INDEX: 21.16 KG/M2 | HEART RATE: 56 BPM | TEMPERATURE: 98 F

## 2019-02-11 DIAGNOSIS — N39.0 URINARY TRACT INFECTION WITHOUT HEMATURIA, SITE UNSPECIFIED: Primary | ICD-10-CM

## 2019-02-11 DIAGNOSIS — R41.0 CONFUSED: ICD-10-CM

## 2019-02-11 LAB
ALBUMIN SERPL BCP-MCNC: 3.7 G/DL
ALP SERPL-CCNC: 93 U/L
ALT SERPL W/O P-5'-P-CCNC: 14 U/L
ANION GAP SERPL CALC-SCNC: 6 MMOL/L
AST SERPL-CCNC: 20 U/L
BACTERIA #/AREA URNS HPF: ABNORMAL /HPF
BASOPHILS # BLD AUTO: 0.03 K/UL
BASOPHILS NFR BLD: 0.5 %
BILIRUB SERPL-MCNC: 0.4 MG/DL
BILIRUB UR QL STRIP: NEGATIVE
BUN SERPL-MCNC: 17 MG/DL
CALCIUM SERPL-MCNC: 10.6 MG/DL
CHLORIDE SERPL-SCNC: 106 MMOL/L
CLARITY UR: CLEAR
CO2 SERPL-SCNC: 28 MMOL/L
COLOR UR: YELLOW
CREAT SERPL-MCNC: 0.8 MG/DL
DIFFERENTIAL METHOD: ABNORMAL
EOSINOPHIL # BLD AUTO: 0.3 K/UL
EOSINOPHIL NFR BLD: 6.1 %
ERYTHROCYTE [DISTWIDTH] IN BLOOD BY AUTOMATED COUNT: 14.7 %
EST. GFR  (AFRICAN AMERICAN): >60 ML/MIN/1.73 M^2
EST. GFR  (NON AFRICAN AMERICAN): >60 ML/MIN/1.73 M^2
GLUCOSE SERPL-MCNC: 96 MG/DL
GLUCOSE UR QL STRIP: NEGATIVE
HCT VFR BLD AUTO: 41.9 %
HGB BLD-MCNC: 14.4 G/DL
HGB UR QL STRIP: NEGATIVE
KETONES UR QL STRIP: NEGATIVE
LACTATE SERPL-SCNC: 1.2 MMOL/L
LEUKOCYTE ESTERASE UR QL STRIP: ABNORMAL
LYMPHOCYTES # BLD AUTO: 1.9 K/UL
LYMPHOCYTES NFR BLD: 34.9 %
MCH RBC QN AUTO: 29 PG
MCHC RBC AUTO-ENTMCNC: 34.4 G/DL
MCV RBC AUTO: 85 FL
MICROSCOPIC COMMENT: ABNORMAL
MONOCYTES # BLD AUTO: 0.6 K/UL
MONOCYTES NFR BLD: 11.6 %
NEUTROPHILS # BLD AUTO: 2.6 K/UL
NEUTROPHILS NFR BLD: 46.7 %
NITRITE UR QL STRIP: NEGATIVE
PH UR STRIP: 6 [PH] (ref 5–8)
PLATELET # BLD AUTO: 204 K/UL
PMV BLD AUTO: 10.2 FL
POTASSIUM SERPL-SCNC: 3.9 MMOL/L
PROT SERPL-MCNC: 7.7 G/DL
PROT UR QL STRIP: NEGATIVE
RBC # BLD AUTO: 4.96 M/UL
RBC #/AREA URNS HPF: 3 /HPF (ref 0–4)
SODIUM SERPL-SCNC: 140 MMOL/L
SP GR UR STRIP: 1.01 (ref 1–1.03)
SQUAMOUS #/AREA URNS HPF: 1 /HPF
TROPONIN I SERPL DL<=0.01 NG/ML-MCNC: 0.01 NG/ML
URN SPEC COLLECT METH UR: ABNORMAL
UROBILINOGEN UR STRIP-ACNC: NEGATIVE EU/DL
WBC # BLD AUTO: 5.53 K/UL
WBC #/AREA URNS HPF: 50 /HPF (ref 0–5)

## 2019-02-11 PROCEDURE — 80053 COMPREHEN METABOLIC PANEL: CPT

## 2019-02-11 PROCEDURE — 96361 HYDRATE IV INFUSION ADD-ON: CPT

## 2019-02-11 PROCEDURE — 93005 ELECTROCARDIOGRAM TRACING: CPT

## 2019-02-11 PROCEDURE — 87088 URINE BACTERIA CULTURE: CPT

## 2019-02-11 PROCEDURE — 96365 THER/PROPH/DIAG IV INF INIT: CPT

## 2019-02-11 PROCEDURE — 96375 TX/PRO/DX INJ NEW DRUG ADDON: CPT

## 2019-02-11 PROCEDURE — 87077 CULTURE AEROBIC IDENTIFY: CPT

## 2019-02-11 PROCEDURE — 93010 EKG 12-LEAD: ICD-10-PCS | Mod: ,,, | Performed by: INTERNAL MEDICINE

## 2019-02-11 PROCEDURE — 63600175 PHARM REV CODE 636 W HCPCS: Performed by: EMERGENCY MEDICINE

## 2019-02-11 PROCEDURE — 85025 COMPLETE CBC W/AUTO DIFF WBC: CPT

## 2019-02-11 PROCEDURE — 83605 ASSAY OF LACTIC ACID: CPT

## 2019-02-11 PROCEDURE — 87086 URINE CULTURE/COLONY COUNT: CPT

## 2019-02-11 PROCEDURE — 84484 ASSAY OF TROPONIN QUANT: CPT

## 2019-02-11 PROCEDURE — 25000003 PHARM REV CODE 250: Performed by: EMERGENCY MEDICINE

## 2019-02-11 PROCEDURE — 87186 SC STD MICRODIL/AGAR DIL: CPT

## 2019-02-11 PROCEDURE — 99284 EMERGENCY DEPT VISIT MOD MDM: CPT | Mod: 25

## 2019-02-11 PROCEDURE — 93010 ELECTROCARDIOGRAM REPORT: CPT | Mod: ,,, | Performed by: INTERNAL MEDICINE

## 2019-02-11 PROCEDURE — 87040 BLOOD CULTURE FOR BACTERIA: CPT

## 2019-02-11 PROCEDURE — 81000 URINALYSIS NONAUTO W/SCOPE: CPT

## 2019-02-11 RX ORDER — NALOXONE HCL 0.4 MG/ML
0.4 VIAL (ML) INJECTION
Status: COMPLETED | OUTPATIENT
Start: 2019-02-11 | End: 2019-02-11

## 2019-02-11 RX ORDER — CALCITRIOL 0.25 UG/1
0.25 CAPSULE ORAL DAILY
COMMUNITY

## 2019-02-11 RX ORDER — SULFAMETHOXAZOLE AND TRIMETHOPRIM 800; 160 MG/1; MG/1
1 TABLET ORAL 2 TIMES DAILY
Qty: 28 TABLET | Refills: 0 | Status: SHIPPED | OUTPATIENT
Start: 2019-02-11 | End: 2019-02-25

## 2019-02-11 RX ORDER — HYDROCODONE BITARTRATE AND ACETAMINOPHEN 5; 325 MG/1; MG/1
1 TABLET ORAL EVERY 6 HOURS PRN
COMMUNITY

## 2019-02-11 RX ORDER — METOPROLOL TARTRATE 25 MG/1
25 TABLET, FILM COATED ORAL 2 TIMES DAILY
COMMUNITY

## 2019-02-11 RX ORDER — CEPHALEXIN 500 MG/1
500 CAPSULE ORAL EVERY 6 HOURS
Qty: 56 CAPSULE | Refills: 0 | Status: SHIPPED | OUTPATIENT
Start: 2019-02-11 | End: 2019-02-25

## 2019-02-11 RX ORDER — IPRATROPIUM BROMIDE 0.5 MG/2.5ML
500 SOLUTION RESPIRATORY (INHALATION) 4 TIMES DAILY
COMMUNITY

## 2019-02-11 RX ADMIN — CEFTRIAXONE 2 G: 2 INJECTION, SOLUTION INTRAVENOUS at 10:02

## 2019-02-11 RX ADMIN — NALOXONE HYDROCHLORIDE 0.4 MG: 0.4 INJECTION, SOLUTION INTRAMUSCULAR; INTRAVENOUS; SUBCUTANEOUS at 09:02

## 2019-02-11 RX ADMIN — SODIUM CHLORIDE 1000 ML: 0.9 INJECTION, SOLUTION INTRAVENOUS at 10:02

## 2019-02-11 NOTE — DISCHARGE INSTRUCTIONS

## 2019-02-11 NOTE — ED PROVIDER NOTES
"Encounter Date: 2/11/2019       History     Chief Complaint   Patient presents with    Stroke Like Symptoms     From [facility] with right sided facial droop. Last seen normal at 10 pm      89 y.o. 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  No date: Anticoagulant long-term use      Comment:  stopped 2-28-13  No date: Arthritis  No date: Atrial fibrillation      Comment:  on medication  1953: Blood transfusion      Comment:  in   No date: Cancer      Comment:  skin cancer  No date: COPD (chronic obstructive pulmonary disease)  No date: Dysphagia  No date: GERD (gastroesophageal reflux disease)  No date: Glaucoma  No date: Heart failure  No date: Hypertension      Comment:  on medicatin  No date: Meningioma      Comment:  Rt side, affecting Rt eye (Benign)  No date: Stroke      Comment:  8/1/15  No date: Vascular dementia without behavioral disturbance     Sent in from RN home for evaluation of altered mental state. Pt initially felt to have a stroke due to "not talking and facial droop". Medics report normal neuro exam upon their arrival however pt was drowsy but rousable. 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     Anticoagulant long-term use     stopped 2-28-13    Arthritis     Atrial fibrillation     on medication    Blood transfusion 1953    in     Cancer     skin cancer    COPD (chronic obstructive pulmonary disease)     Dysphagia     GERD (gastroesophageal reflux disease)     Glaucoma     Heart failure     Hypertension     on medicatin    Meningioma     Rt side, affecting Rt eye (Benign)    Stroke     8/1/15    Vascular dementia " without behavioral disturbance      Past Surgical History:   Procedure Laterality Date    CARDIAC SURGERY      bilateral Cataracts    EXCISION, CARCINOMA, BASAL CELL Right 3/8/2013    Per[facility]gu[facility]    PROSTATE SURGERY      Rt  AKA      wears leg prosthesis     No family history on file.  Social History     Tobacco Use    Smoking status: Former Smoker     Last attempt to quit: 3/4/1973     Years since quittin.9    Smokeless tobacco: Never Used   Substance Use Topics    Alcohol use: No    Drug use: No     Review of Systems   Unable to perform ROS: Dementia   Constitutional: Negative for fever.   HENT: Negative for sore throat.    Respiratory: Negative for shortness of breath.    Cardiovascular: Negative for chest pain.   Gastrointestinal: Negative for nausea.   Genitourinary: Negative for dysuria.   Musculoskeletal: Negative for back pain.   Skin: Negative for rash.   Neurological: Negative for weakness.   Hematological: Does not bruise/bleed easily.   All other systems reviewed and are negative.      Physical Exam     Initial Vitals   BP Pulse Resp Temp SpO2   -- -- -- -- --      MAP       --         Physical Exam    Nursing note and vitals reviewed.  Constitutional: He appears well-developed and well-nourished.   HENT:   Head: Normocephalic and atraumatic.   Eyes: EOM are normal. Pupils are equal, round, and reactive to light.   Cardiovascular: Normal rate and regular rhythm.   Pulmonary/Chest: Effort normal.   Abdominal: He exhibits no distension.   Musculoskeletal: He exhibits no edema or tenderness.   Neurological: He is alert and oriented to person, place, and time. No cranial nerve deficit.   Skin: Skin is warm and dry.       Pinpoint pupils b/l \  Drowsy but rousable  Prior RLE BKA  Responds to pain  No facial droop,  symmetric  Pt speaks in full sentences  ED Course   Procedures  Labs Reviewed   CULTURE, BLOOD   CULTURE, BLOOD   CBC W/ AUTO DIFFERENTIAL    COMPREHENSIVE METABOLIC PANEL   TROPONIN I   URINALYSIS, REFLEX TO URINE CULTURE   LACTIC ACID, PLASMA     EKG Readings: (Independently Interpreted)   Hr 57, R axis, sinus kevin, no prudence/twi. Non acute.        Imaging Results    None          Medical Decision Making:   Initial Assessment:   Pt appears possibly overmedicated vs delerious ?UTI. Have started infectious workup. Will get head ct as he is on blood thinner. He has no stroke symptoms.                 will discharge on keflex bactrim based on prior culture     Clinical Impression:   The primary encounter diagnosis was Urinary tract infection without hematuria, site unspecified. A diagnosis of Confused was also pertinent to this visit.                             Gabriella Adair MD  02/11/19 [id number]

## 2019-02-11 NOTE — ED TRIAGE NOTES
Pt to the ED via [facility] from [facility] with c/o R sided facial drop. Upon arrival no facial droop noted, EMS denies facial drop upon their arrival as well. Pt appears weak, lethargic and difficult to arouse. No unilateral weakness noted. Pt follows commands, however delayed. Noted redness to R eye. CBG obtained in triage, 96 mg/dL.

## 2019-02-15 LAB — BACTERIA UR CULT: NORMAL

## 2019-02-16 LAB
BACTERIA BLD CULT: NORMAL
BACTERIA BLD CULT: NORMAL